# Patient Record
Sex: MALE | URBAN - METROPOLITAN AREA
[De-identification: names, ages, dates, MRNs, and addresses within clinical notes are randomized per-mention and may not be internally consistent; named-entity substitution may affect disease eponyms.]

---

## 2019-07-30 ENCOUNTER — OFFICE VISIT (OUTPATIENT)
Dept: URBAN - METROPOLITAN AREA CLINIC 81 | Facility: CLINIC | Age: 69
End: 2019-07-30
Payer: COMMERCIAL

## 2019-07-30 DIAGNOSIS — H52.4 PRESBYOPIA: Primary | ICD-10-CM

## 2019-07-30 PROCEDURE — 92014 COMPRE OPH EXAM EST PT 1/>: CPT | Performed by: OPTOMETRIST

## 2019-07-30 ASSESSMENT — INTRAOCULAR PRESSURE
OS: 12
OD: 14

## 2019-07-30 ASSESSMENT — KERATOMETRY
OD: 44.63
OS: 45.00

## 2019-07-30 ASSESSMENT — VISUAL ACUITY
OS: 20/30
OD: 20/30

## 2019-07-30 NOTE — IMPRESSION/PLAN
Impression: Age-related nuclear cataract, bilateral: H25.13. Plan: Discussed diagnosis & treatment options with patient. Hold off on new gls, pt aware new glasses will not improve vision as much as cat surgery potentially could. Pt understands and wishes to proceed with surgery. Recommend CEIOL OS then OD. STANDARD IOL. Target: Distance. RL2. Schedule ASCAN then Pre Op with Dr. Rashida Qureshi.

## 2020-01-01 ENCOUNTER — OFFICE VISIT (OUTPATIENT)
Dept: URBAN - METROPOLITAN AREA CLINIC 76 | Facility: CLINIC | Age: 70
End: 2020-01-01
Payer: COMMERCIAL

## 2020-01-01 DIAGNOSIS — H04.123 DRY EYE SYNDROME OF BILATERAL LACRIMAL GLANDS: ICD-10-CM

## 2020-01-01 DIAGNOSIS — H25.13 AGE-RELATED NUCLEAR CATARACT, BILATERAL: ICD-10-CM

## 2020-01-01 PROCEDURE — 92014 COMPRE OPH EXAM EST PT 1/>: CPT | Performed by: OPTOMETRIST

## 2020-01-01 ASSESSMENT — KERATOMETRY
OD: 44.75
OS: 44.75

## 2020-01-01 ASSESSMENT — VISUAL ACUITY
OD: 20/30
OS: 20/40

## 2020-01-01 ASSESSMENT — INTRAOCULAR PRESSURE
OD: 15
OS: 13

## 2022-02-04 NOTE — IMPRESSION/PLAN
Impression: Presbyopia: H52.4. Bilateral. Plan: Discussed condition. New mrx given today. Pt to call with any concerns. Pt states he took benadryl for pain 90 mins prior to arrival and it didn't help. Pt took tylenol at 1300 today.